# Patient Record
Sex: FEMALE | Race: WHITE | HISPANIC OR LATINO | Employment: UNEMPLOYED | URBAN - METROPOLITAN AREA
[De-identification: names, ages, dates, MRNs, and addresses within clinical notes are randomized per-mention and may not be internally consistent; named-entity substitution may affect disease eponyms.]

---

## 2020-09-15 DIAGNOSIS — Z00.00 ROUTINE GENERAL MEDICAL EXAMINATION AT A HEALTH CARE FACILITY: Primary | ICD-10-CM

## 2020-10-21 NOTE — PROGRESS NOTES
"Subjective:      Patient ID: Yessi Guajardo is a 30 y.o. female.    Chief Complaint: Executive Health      HPI  Pt here for first year of Executive physical with [employer].  Just delivered healthy baby, breastfeeding.  No f/c/sw/cough. No cp/sob/palp.  BMs ok with fiber.  Urine normal.      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    Review of Systems   Constitutional: Negative for appetite change, chills, diaphoresis and fever.   HENT: Negative for congestion, ear pain, rhinorrhea, sinus pressure and sore throat.    Respiratory: Negative for cough, chest tightness and shortness of breath.    Cardiovascular: Negative for chest pain and palpitations.   Gastrointestinal: Negative for blood in stool, constipation, diarrhea, nausea and vomiting.   Genitourinary: Negative for dysuria, frequency, hematuria, menstrual problem, urgency and vaginal discharge.   Musculoskeletal: Negative for arthralgias.   Skin: Negative for rash.   Neurological: Negative for dizziness and headaches.   Psychiatric/Behavioral: Negative for sleep disturbance. The patient is not nervous/anxious.          Objective:   /68 (BP Location: Right arm, Patient Position: Sitting, BP Method: Medium (Manual))   Pulse 86   Temp 98.8 °F (37.1 °C) (Oral)   Resp 16   Ht 5' 5" (1.651 m)   Wt 77 kg (169 lb 12.1 oz)   LMP 01/01/2020 (Within Days)   SpO2 98%   BMI 28.25 kg/m²     Physical Exam  Constitutional:       Appearance: She is well-developed.   HENT:      Right Ear: External ear normal. Tympanic membrane is not injected.      Left Ear: External ear normal. Tympanic membrane " is not injected.   Eyes:      Conjunctiva/sclera: Conjunctivae normal.   Neck:      Musculoskeletal: Normal range of motion and neck supple.      Thyroid: No thyromegaly.   Cardiovascular:      Rate and Rhythm: Normal rate and regular rhythm.      Heart sounds: No murmur. No friction rub. No gallop.    Pulmonary:      Effort: Pulmonary effort is normal.      Breath sounds: Normal breath sounds. No wheezing or rales.   Abdominal:      General: Bowel sounds are normal.      Palpations: Abdomen is soft. There is no mass.      Tenderness: There is no abdominal tenderness.   Lymphadenopathy:      Cervical: No cervical adenopathy.   Skin:     General: Skin is warm.      Findings: No rash.   Neurological:      Mental Status: She is alert and oriented to person, place, and time.       CXR  EKG normal.      Assessment:       1. Encounter for preventive health examination          Plan:     Encounter for preventive health examination- Report results when available.

## 2020-10-29 DIAGNOSIS — Z00.00 ROUTINE GENERAL MEDICAL EXAMINATION AT A HEALTH CARE FACILITY: Primary | ICD-10-CM

## 2020-10-30 ENCOUNTER — HOSPITAL ENCOUNTER (OUTPATIENT)
Dept: RADIOLOGY | Facility: HOSPITAL | Age: 30
Discharge: HOME | End: 2020-10-30
Attending: FAMILY MEDICINE
Payer: COMMERCIAL

## 2020-10-30 ENCOUNTER — HOSPITAL ENCOUNTER (OUTPATIENT)
Dept: CARDIOLOGY | Facility: HOSPITAL | Age: 30
Discharge: HOME | End: 2020-10-30
Attending: FAMILY MEDICINE
Payer: COMMERCIAL

## 2020-10-30 ENCOUNTER — CLINICAL SUPPORT (OUTPATIENT)
Dept: INTERNAL MEDICINE | Facility: CLINIC | Age: 30
End: 2020-10-30
Payer: COMMERCIAL

## 2020-10-30 ENCOUNTER — OFFICE VISIT (OUTPATIENT)
Dept: INTERNAL MEDICINE | Facility: CLINIC | Age: 30
End: 2020-10-30
Payer: COMMERCIAL

## 2020-10-30 VITALS
WEIGHT: 169.75 LBS | OXYGEN SATURATION: 98 % | HEIGHT: 65 IN | RESPIRATION RATE: 16 BRPM | HEART RATE: 86 BPM | SYSTOLIC BLOOD PRESSURE: 112 MMHG | DIASTOLIC BLOOD PRESSURE: 68 MMHG | TEMPERATURE: 99 F | BODY MASS INDEX: 28.28 KG/M2

## 2020-10-30 DIAGNOSIS — Z00.00 ROUTINE GENERAL MEDICAL EXAMINATION AT A HEALTH CARE FACILITY: ICD-10-CM

## 2020-10-30 DIAGNOSIS — Z00.00 ROUTINE GENERAL MEDICAL EXAMINATION AT A HEALTH CARE FACILITY: Primary | ICD-10-CM

## 2020-10-30 DIAGNOSIS — Z00.00 ENCOUNTER FOR PREVENTIVE HEALTH EXAMINATION: Primary | ICD-10-CM

## 2020-10-30 LAB
ALBUMIN SERPL BCP-MCNC: 3.7 G/DL (ref 3.5–5.2)
ALP SERPL-CCNC: 73 U/L (ref 55–135)
ALT SERPL W/O P-5'-P-CCNC: 22 U/L (ref 10–44)
ANION GAP SERPL CALC-SCNC: 9 MMOL/L (ref 8–16)
AST SERPL-CCNC: 22 U/L (ref 10–40)
BILIRUB SERPL-MCNC: 0.3 MG/DL (ref 0.1–1)
BUN SERPL-MCNC: 11 MG/DL (ref 6–20)
CALCIUM SERPL-MCNC: 9.2 MG/DL (ref 8.7–10.5)
CHLORIDE SERPL-SCNC: 110 MMOL/L (ref 95–110)
CHOLEST SERPL-MCNC: 258 MG/DL (ref 120–199)
CHOLEST/HDLC SERPL: 4.6 {RATIO} (ref 2–5)
CO2 SERPL-SCNC: 22 MMOL/L (ref 23–29)
CREAT SERPL-MCNC: 0.6 MG/DL (ref 0.5–1.4)
ERYTHROCYTE [DISTWIDTH] IN BLOOD BY AUTOMATED COUNT: 12.3 % (ref 11.5–14.5)
EST. GFR  (AFRICAN AMERICAN): >60 ML/MIN/1.73 M^2
EST. GFR  (NON AFRICAN AMERICAN): >60 ML/MIN/1.73 M^2
ESTIMATED AVG GLUCOSE: 88 MG/DL (ref 68–131)
GLUCOSE SERPL-MCNC: 92 MG/DL (ref 70–110)
HBA1C MFR BLD HPLC: 4.7 % (ref 4–5.6)
HCT VFR BLD AUTO: 37.4 % (ref 37–48.5)
HDLC SERPL-MCNC: 56 MG/DL (ref 40–75)
HDLC SERPL: 21.7 % (ref 20–50)
HGB BLD-MCNC: 11.9 G/DL (ref 12–16)
LDLC SERPL CALC-MCNC: 177.6 MG/DL (ref 63–159)
MCH RBC QN AUTO: 30.7 PG (ref 27–31)
MCHC RBC AUTO-ENTMCNC: 31.8 G/DL (ref 32–36)
MCV RBC AUTO: 96 FL (ref 82–98)
NONHDLC SERPL-MCNC: 202 MG/DL
PLATELET # BLD AUTO: 348 K/UL (ref 150–350)
PMV BLD AUTO: 10.5 FL (ref 9.2–12.9)
POTASSIUM SERPL-SCNC: 4.6 MMOL/L (ref 3.5–5.1)
PROT SERPL-MCNC: 7.3 G/DL (ref 6–8.4)
RBC # BLD AUTO: 3.88 M/UL (ref 4–5.4)
SODIUM SERPL-SCNC: 141 MMOL/L (ref 136–145)
TRIGL SERPL-MCNC: 122 MG/DL (ref 30–150)
TSH SERPL DL<=0.005 MIU/L-ACNC: 1.74 UIU/ML (ref 0.4–4)
WBC # BLD AUTO: 6.02 K/UL (ref 3.9–12.7)

## 2020-10-30 PROCEDURE — 99385 PR PREVENTIVE VISIT,NEW,18-39: ICD-10-PCS | Mod: S$GLB,,, | Performed by: INTERNAL MEDICINE

## 2020-10-30 PROCEDURE — 71046 X-RAY EXAM CHEST 2 VIEWS: CPT | Mod: 26,,, | Performed by: RADIOLOGY

## 2020-10-30 PROCEDURE — 83036 HEMOGLOBIN GLYCOSYLATED A1C: CPT

## 2020-10-30 PROCEDURE — 86803 HEPATITIS C AB TEST: CPT

## 2020-10-30 PROCEDURE — 71046 X-RAY EXAM CHEST 2 VIEWS: CPT | Mod: TC

## 2020-10-30 PROCEDURE — 99999 PR PBB SHADOW E&M-EST. PATIENT-LVL III: CPT | Mod: PBBFAC,,, | Performed by: INTERNAL MEDICINE

## 2020-10-30 PROCEDURE — 99999 PR PBB SHADOW E&M-EST. PATIENT-LVL III: ICD-10-PCS | Mod: PBBFAC,,, | Performed by: INTERNAL MEDICINE

## 2020-10-30 PROCEDURE — 80061 LIPID PANEL: CPT

## 2020-10-30 PROCEDURE — 99385 PREV VISIT NEW AGE 18-39: CPT | Mod: S$GLB,,, | Performed by: INTERNAL MEDICINE

## 2020-10-30 PROCEDURE — 93010 EKG 12-LEAD: ICD-10-PCS | Mod: ,,, | Performed by: INTERNAL MEDICINE

## 2020-10-30 PROCEDURE — 93005 ELECTROCARDIOGRAM TRACING: CPT

## 2020-10-30 PROCEDURE — 86703 HIV-1/HIV-2 1 RESULT ANTBDY: CPT

## 2020-10-30 PROCEDURE — 80053 COMPREHEN METABOLIC PANEL: CPT

## 2020-10-30 PROCEDURE — 71046 XR CHEST PA AND LATERAL: ICD-10-PCS | Mod: 26,,, | Performed by: RADIOLOGY

## 2020-10-30 PROCEDURE — 93010 ELECTROCARDIOGRAM REPORT: CPT | Mod: ,,, | Performed by: INTERNAL MEDICINE

## 2020-10-30 PROCEDURE — 85027 COMPLETE CBC AUTOMATED: CPT

## 2020-10-30 PROCEDURE — 84443 ASSAY THYROID STIM HORMONE: CPT

## 2020-10-30 NOTE — LETTER
October 30, 2020    Yessi Guajardo  [address]  [place] [address]             [facility]  [address]  [place] [address]  Phone: [phone]  Fax: [phone] Dear Ms. Carla Guajardo,    It was a pleasure to see you and perform your Executive Health physical on Friday, October 30, 2020.  At the time of your visit you are 30 years old.  You have had no fevers, chills, sweats, or cough.  You deny any exertional chest pain, shortness of breath, or palpitations.  Your bowel and bladder function are pretty normal.    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  You take only prenatal vitamins.    Your health maintenance includes 10/2020 flu vaccination, 07/2020 tetanus diphtheria pertussis vaccination, gynecologic exam is up-to-date.     On physical exam height is you 5 ft 5 in , your weight is 169 lb,  body mass index is 28.25.Your blood pressure 112/68,  pulse 86,  Your general appearance is well-developed,  with no distress. Your head and neck exam is normal.  Your heart has a regular rate and rhythm with no abnormal sounds.  Your lungs are clear throughout, with normal respiratory effort.  Your abdomen has normal bowel sounds with no masses or enlarged organs noted. Your extremities have strong distal pulses, with no swelling.     Your chest x-ray and EKG are normal.  Your blood count is normal, platelets 348 normal.  Your kidney and liver function is normal.  Your fasting glucose 92, normal.  Your electrolytes are normal.  Your total cholesterol is 258, HDL 56, , triglycerides 122.  This is pretty good overall considering you have just had a pregnancy.  Hemoglobin A1c 4.7%, normal.  TSH 1.740, normal.  HIV test is negative.    In summary, you appear to be in good overall health.  Please do increase your activity as tolerated after you recover from giving birth.  You are up to date with all vaccinations and preventive care  needed at this time.    It was a pleasure to see you and perform your executive physical.  If I can be of further assistance, or if you have any other questions or concerns please do not hesitate to let me know.    Yours very truly,      Ally Cleveland MD

## 2020-11-02 LAB
HCV AB SERPL QL IA: NEGATIVE
HIV 1+2 AB+HIV1 P24 AG SERPL QL IA: NEGATIVE

## 2021-09-17 ENCOUNTER — OFFICE VISIT (OUTPATIENT)
Dept: INTERNAL MEDICINE | Facility: CLINIC | Age: 31
End: 2021-09-17
Payer: COMMERCIAL

## 2021-09-17 ENCOUNTER — CLINICAL SUPPORT (OUTPATIENT)
Dept: INTERNAL MEDICINE | Facility: CLINIC | Age: 31
End: 2021-09-17
Payer: COMMERCIAL

## 2021-09-17 VITALS
TEMPERATURE: 96 F | HEART RATE: 77 BPM | DIASTOLIC BLOOD PRESSURE: 64 MMHG | WEIGHT: 171.94 LBS | SYSTOLIC BLOOD PRESSURE: 101 MMHG | BODY MASS INDEX: 28.65 KG/M2 | HEIGHT: 65 IN

## 2021-09-17 DIAGNOSIS — Z00.00 ROUTINE GENERAL MEDICAL EXAMINATION AT A HEALTH CARE FACILITY: Primary | ICD-10-CM

## 2021-09-17 LAB
ALBUMIN SERPL BCP-MCNC: 3.1 G/DL (ref 3.5–5.2)
ALP SERPL-CCNC: 44 U/L (ref 55–135)
ALT SERPL W/O P-5'-P-CCNC: 9 U/L (ref 10–44)
ANION GAP SERPL CALC-SCNC: 8 MMOL/L (ref 8–16)
AST SERPL-CCNC: 10 U/L (ref 10–40)
BILIRUB SERPL-MCNC: 0.4 MG/DL (ref 0.1–1)
BUN SERPL-MCNC: 4 MG/DL (ref 6–20)
CALCIUM SERPL-MCNC: 8.6 MG/DL (ref 8.7–10.5)
CHLORIDE SERPL-SCNC: 108 MMOL/L (ref 95–110)
CHOLEST SERPL-MCNC: 200 MG/DL (ref 120–199)
CHOLEST/HDLC SERPL: 3.6 {RATIO} (ref 2–5)
CO2 SERPL-SCNC: 21 MMOL/L (ref 23–29)
CREAT SERPL-MCNC: 0.5 MG/DL (ref 0.5–1.4)
ERYTHROCYTE [DISTWIDTH] IN BLOOD BY AUTOMATED COUNT: 12.7 % (ref 11.5–14.5)
EST. GFR  (AFRICAN AMERICAN): >60 ML/MIN/1.73 M^2
EST. GFR  (NON AFRICAN AMERICAN): >60 ML/MIN/1.73 M^2
ESTIMATED AVG GLUCOSE: 88 MG/DL (ref 68–131)
GLUCOSE SERPL-MCNC: 87 MG/DL (ref 70–110)
HBA1C MFR BLD: 4.7 % (ref 4–5.6)
HCT VFR BLD AUTO: 34.9 % (ref 37–48.5)
HDLC SERPL-MCNC: 56 MG/DL (ref 40–75)
HDLC SERPL: 28 % (ref 20–50)
HGB BLD-MCNC: 12 G/DL (ref 12–16)
LDLC SERPL CALC-MCNC: 107.4 MG/DL (ref 63–159)
MCH RBC QN AUTO: 32.5 PG (ref 27–31)
MCHC RBC AUTO-ENTMCNC: 34.4 G/DL (ref 32–36)
MCV RBC AUTO: 95 FL (ref 82–98)
NONHDLC SERPL-MCNC: 144 MG/DL
PLATELET # BLD AUTO: 256 K/UL (ref 150–450)
PMV BLD AUTO: 10.7 FL (ref 9.2–12.9)
POTASSIUM SERPL-SCNC: 3.7 MMOL/L (ref 3.5–5.1)
PROT SERPL-MCNC: 6.4 G/DL (ref 6–8.4)
RBC # BLD AUTO: 3.69 M/UL (ref 4–5.4)
SODIUM SERPL-SCNC: 137 MMOL/L (ref 136–145)
TRIGL SERPL-MCNC: 183 MG/DL (ref 30–150)
TSH SERPL DL<=0.005 MIU/L-ACNC: 1.49 UIU/ML (ref 0.4–4)
WBC # BLD AUTO: 7.32 K/UL (ref 3.9–12.7)

## 2021-09-17 PROCEDURE — 3074F PR MOST RECENT SYSTOLIC BLOOD PRESSURE < 130 MM HG: ICD-10-PCS | Mod: CPTII,S$GLB,, | Performed by: INTERNAL MEDICINE

## 2021-09-17 PROCEDURE — 3044F PR MOST RECENT HEMOGLOBIN A1C LEVEL <7.0%: ICD-10-PCS | Mod: CPTII,S$GLB,, | Performed by: INTERNAL MEDICINE

## 2021-09-17 PROCEDURE — 99999 PR PBB SHADOW E&M-EST. PATIENT-LVL III: CPT | Mod: PBBFAC,,, | Performed by: INTERNAL MEDICINE

## 2021-09-17 PROCEDURE — 3044F HG A1C LEVEL LT 7.0%: CPT | Mod: CPTII,S$GLB,, | Performed by: INTERNAL MEDICINE

## 2021-09-17 PROCEDURE — 83036 HEMOGLOBIN GLYCOSYLATED A1C: CPT | Performed by: INTERNAL MEDICINE

## 2021-09-17 PROCEDURE — 3008F PR BODY MASS INDEX (BMI) DOCUMENTED: ICD-10-PCS | Mod: CPTII,S$GLB,, | Performed by: INTERNAL MEDICINE

## 2021-09-17 PROCEDURE — 3078F DIAST BP <80 MM HG: CPT | Mod: CPTII,S$GLB,, | Performed by: INTERNAL MEDICINE

## 2021-09-17 PROCEDURE — 80061 LIPID PANEL: CPT | Performed by: INTERNAL MEDICINE

## 2021-09-17 PROCEDURE — 1159F MED LIST DOCD IN RCRD: CPT | Mod: CPTII,S$GLB,, | Performed by: INTERNAL MEDICINE

## 2021-09-17 PROCEDURE — 3008F BODY MASS INDEX DOCD: CPT | Mod: CPTII,S$GLB,, | Performed by: INTERNAL MEDICINE

## 2021-09-17 PROCEDURE — 85027 COMPLETE CBC AUTOMATED: CPT | Performed by: INTERNAL MEDICINE

## 2021-09-17 PROCEDURE — 3078F PR MOST RECENT DIASTOLIC BLOOD PRESSURE < 80 MM HG: ICD-10-PCS | Mod: CPTII,S$GLB,, | Performed by: INTERNAL MEDICINE

## 2021-09-17 PROCEDURE — 80053 COMPREHEN METABOLIC PANEL: CPT | Performed by: INTERNAL MEDICINE

## 2021-09-17 PROCEDURE — 99395 PR PREVENTIVE VISIT,EST,18-39: ICD-10-PCS | Mod: S$GLB,,, | Performed by: INTERNAL MEDICINE

## 2021-09-17 PROCEDURE — 1159F PR MEDICATION LIST DOCUMENTED IN MEDICAL RECORD: ICD-10-PCS | Mod: CPTII,S$GLB,, | Performed by: INTERNAL MEDICINE

## 2021-09-17 PROCEDURE — 99395 PREV VISIT EST AGE 18-39: CPT | Mod: S$GLB,,, | Performed by: INTERNAL MEDICINE

## 2021-09-17 PROCEDURE — 3074F SYST BP LT 130 MM HG: CPT | Mod: CPTII,S$GLB,, | Performed by: INTERNAL MEDICINE

## 2021-09-17 PROCEDURE — 86803 HEPATITIS C AB TEST: CPT | Performed by: INTERNAL MEDICINE

## 2021-09-17 PROCEDURE — 84443 ASSAY THYROID STIM HORMONE: CPT | Performed by: INTERNAL MEDICINE

## 2021-09-17 PROCEDURE — 99999 PR PBB SHADOW E&M-EST. PATIENT-LVL III: ICD-10-PCS | Mod: PBBFAC,,, | Performed by: INTERNAL MEDICINE

## 2021-09-20 LAB — HCV AB SERPL QL IA: NEGATIVE

## 2022-08-29 DIAGNOSIS — Z00.00 ROUTINE GENERAL MEDICAL EXAMINATION AT A HEALTH CARE FACILITY: Primary | ICD-10-CM

## 2022-09-01 ENCOUNTER — HOSPITAL ENCOUNTER (OUTPATIENT)
Dept: CARDIOLOGY | Facility: HOSPITAL | Age: 32
Discharge: HOME | End: 2022-09-01
Payer: COMMERCIAL

## 2022-09-01 ENCOUNTER — OFFICE VISIT (OUTPATIENT)
Dept: INTERNAL MEDICINE | Facility: CLINIC | Age: 32
End: 2022-09-01
Payer: COMMERCIAL

## 2022-09-01 ENCOUNTER — OFFICE VISIT (OUTPATIENT)
Dept: PRIMARY CARE CLINIC | Facility: CLINIC | Age: 32
End: 2022-09-01
Payer: COMMERCIAL

## 2022-09-01 ENCOUNTER — CLINICAL SUPPORT (OUTPATIENT)
Dept: INTERNAL MEDICINE | Facility: CLINIC | Age: 32
End: 2022-09-01
Payer: COMMERCIAL

## 2022-09-01 VITALS
DIASTOLIC BLOOD PRESSURE: 70 MMHG | HEIGHT: 64 IN | TEMPERATURE: 98 F | WEIGHT: 187.38 LBS | BODY MASS INDEX: 31.99 KG/M2 | RESPIRATION RATE: 16 BRPM | SYSTOLIC BLOOD PRESSURE: 108 MMHG | HEART RATE: 71 BPM

## 2022-09-01 VITALS
SYSTOLIC BLOOD PRESSURE: 110 MMHG | DIASTOLIC BLOOD PRESSURE: 78 MMHG | RESPIRATION RATE: 18 BRPM | WEIGHT: 189.13 LBS | OXYGEN SATURATION: 100 % | BODY MASS INDEX: 32.47 KG/M2 | HEART RATE: 78 BPM

## 2022-09-01 DIAGNOSIS — E88.819 INSULIN RESISTANCE: Primary | ICD-10-CM

## 2022-09-01 DIAGNOSIS — Z00.00 ROUTINE GENERAL MEDICAL EXAMINATION AT A HEALTH CARE FACILITY: Primary | ICD-10-CM

## 2022-09-01 DIAGNOSIS — E88.810 METABOLIC SYNDROME: ICD-10-CM

## 2022-09-01 DIAGNOSIS — Z00.00 ROUTINE GENERAL MEDICAL EXAMINATION AT A HEALTH CARE FACILITY: ICD-10-CM

## 2022-09-01 DIAGNOSIS — E88.819 INSULIN RESISTANCE: ICD-10-CM

## 2022-09-01 DIAGNOSIS — E66.09 CLASS 1 OBESITY DUE TO EXCESS CALORIES WITH SERIOUS COMORBIDITY AND BODY MASS INDEX (BMI) OF 32.0 TO 32.9 IN ADULT: ICD-10-CM

## 2022-09-01 DIAGNOSIS — E66.9 OBESITY (BMI 30.0-34.9): ICD-10-CM

## 2022-09-01 PROBLEM — E66.811 OBESITY (BMI 30.0-34.9): Status: ACTIVE | Noted: 2022-09-01

## 2022-09-01 PROBLEM — E66.811 CLASS 1 OBESITY DUE TO EXCESS CALORIES WITH BODY MASS INDEX (BMI) OF 32.0 TO 32.9 IN ADULT: Status: ACTIVE | Noted: 2022-09-01

## 2022-09-01 PROBLEM — E66.811 CLASS 1 OBESITY DUE TO EXCESS CALORIES WITH BODY MASS INDEX (BMI) OF 32.0 TO 32.9 IN ADULT: Status: RESOLVED | Noted: 2022-09-01 | Resolved: 2022-09-01

## 2022-09-01 LAB
ALBUMIN SERPL BCP-MCNC: 4.1 G/DL (ref 3.5–5.2)
ALP SERPL-CCNC: 92 U/L (ref 55–135)
ALT SERPL W/O P-5'-P-CCNC: 22 U/L (ref 10–44)
ANION GAP SERPL CALC-SCNC: 11 MMOL/L (ref 8–16)
AST SERPL-CCNC: 22 U/L (ref 10–40)
BILIRUB SERPL-MCNC: 0.6 MG/DL (ref 0.1–1)
BUN SERPL-MCNC: 15 MG/DL (ref 6–20)
CALCIUM SERPL-MCNC: 9.4 MG/DL (ref 8.7–10.5)
CHLORIDE SERPL-SCNC: 109 MMOL/L (ref 95–110)
CHOLEST SERPL-MCNC: 229 MG/DL (ref 120–199)
CHOLEST/HDLC SERPL: 3.8 {RATIO} (ref 2–5)
CO2 SERPL-SCNC: 20 MMOL/L (ref 23–29)
CREAT SERPL-MCNC: 0.5 MG/DL (ref 0.5–1.4)
ERYTHROCYTE [DISTWIDTH] IN BLOOD BY AUTOMATED COUNT: 12.4 % (ref 11.5–14.5)
EST. GFR  (NO RACE VARIABLE): >60 ML/MIN/1.73 M^2
ESTIMATED AVG GLUCOSE: 94 MG/DL (ref 68–131)
GLUCOSE SERPL-MCNC: 78 MG/DL (ref 70–110)
HBA1C MFR BLD: 4.9 % (ref 4–5.6)
HCT VFR BLD AUTO: 41.3 % (ref 37–48.5)
HDLC SERPL-MCNC: 61 MG/DL (ref 40–75)
HDLC SERPL: 26.6 % (ref 20–50)
HGB BLD-MCNC: 14.3 G/DL (ref 12–16)
LDLC SERPL CALC-MCNC: 155.6 MG/DL (ref 63–159)
MCH RBC QN AUTO: 32.1 PG (ref 27–31)
MCHC RBC AUTO-ENTMCNC: 34.6 G/DL (ref 32–36)
MCV RBC AUTO: 93 FL (ref 82–98)
NONHDLC SERPL-MCNC: 168 MG/DL
PLATELET # BLD AUTO: 285 K/UL (ref 150–450)
PMV BLD AUTO: 11.2 FL (ref 9.2–12.9)
POTASSIUM SERPL-SCNC: 4.2 MMOL/L (ref 3.5–5.1)
PROT SERPL-MCNC: 7.9 G/DL (ref 6–8.4)
RBC # BLD AUTO: 4.46 M/UL (ref 4–5.4)
SODIUM SERPL-SCNC: 140 MMOL/L (ref 136–145)
TRIGL SERPL-MCNC: 62 MG/DL (ref 30–150)
WBC # BLD AUTO: 5.49 K/UL (ref 3.9–12.7)

## 2022-09-01 PROCEDURE — 85027 COMPLETE CBC AUTOMATED: CPT | Performed by: FAMILY MEDICINE

## 2022-09-01 PROCEDURE — 93005 ELECTROCARDIOGRAM TRACING: CPT

## 2022-09-01 PROCEDURE — 99395 PR PREVENTIVE VISIT,EST,18-39: ICD-10-PCS | Mod: S$GLB,,, | Performed by: FAMILY MEDICINE

## 2022-09-01 PROCEDURE — 1160F RVW MEDS BY RX/DR IN RCRD: CPT | Mod: CPTII,S$GLB,, | Performed by: FAMILY MEDICINE

## 2022-09-01 PROCEDURE — 99999 PR PBB SHADOW E&M-EST. PATIENT-LVL III: CPT | Mod: PBBFAC,,, | Performed by: FAMILY MEDICINE

## 2022-09-01 PROCEDURE — 93010 ELECTROCARDIOGRAM REPORT: CPT | Mod: ,,, | Performed by: STUDENT IN AN ORGANIZED HEALTH CARE EDUCATION/TRAINING PROGRAM

## 2022-09-01 PROCEDURE — 3078F PR MOST RECENT DIASTOLIC BLOOD PRESSURE < 80 MM HG: ICD-10-PCS | Mod: CPTII,S$GLB,, | Performed by: FAMILY MEDICINE

## 2022-09-01 PROCEDURE — 99999 PR PBB SHADOW E&M-EST. PATIENT-LVL V: ICD-10-PCS | Mod: PBBFAC,,, | Performed by: FAMILY MEDICINE

## 2022-09-01 PROCEDURE — 3078F DIAST BP <80 MM HG: CPT | Mod: CPTII,S$GLB,, | Performed by: FAMILY MEDICINE

## 2022-09-01 PROCEDURE — 3074F PR MOST RECENT SYSTOLIC BLOOD PRESSURE < 130 MM HG: ICD-10-PCS | Mod: CPTII,S$GLB,, | Performed by: FAMILY MEDICINE

## 2022-09-01 PROCEDURE — 99999 PR PBB SHADOW E&M-EST. PATIENT-LVL III: ICD-10-PCS | Mod: PBBFAC,,, | Performed by: FAMILY MEDICINE

## 2022-09-01 PROCEDURE — 93010 EKG 12-LEAD: ICD-10-PCS | Mod: ,,, | Performed by: STUDENT IN AN ORGANIZED HEALTH CARE EDUCATION/TRAINING PROGRAM

## 2022-09-01 PROCEDURE — 80053 COMPREHEN METABOLIC PANEL: CPT | Performed by: FAMILY MEDICINE

## 2022-09-01 PROCEDURE — 80061 LIPID PANEL: CPT | Performed by: FAMILY MEDICINE

## 2022-09-01 PROCEDURE — 3008F BODY MASS INDEX DOCD: CPT | Mod: CPTII,S$GLB,, | Performed by: FAMILY MEDICINE

## 2022-09-01 PROCEDURE — 3008F PR BODY MASS INDEX (BMI) DOCUMENTED: ICD-10-PCS | Mod: CPTII,S$GLB,, | Performed by: FAMILY MEDICINE

## 2022-09-01 PROCEDURE — 99214 OFFICE O/P EST MOD 30 MIN: CPT | Mod: S$GLB,,, | Performed by: FAMILY MEDICINE

## 2022-09-01 PROCEDURE — 99214 PR OFFICE/OUTPT VISIT, EST, LEVL IV, 30-39 MIN: ICD-10-PCS | Mod: S$GLB,,, | Performed by: FAMILY MEDICINE

## 2022-09-01 PROCEDURE — 83036 HEMOGLOBIN GLYCOSYLATED A1C: CPT | Performed by: FAMILY MEDICINE

## 2022-09-01 PROCEDURE — 3074F SYST BP LT 130 MM HG: CPT | Mod: CPTII,S$GLB,, | Performed by: FAMILY MEDICINE

## 2022-09-01 PROCEDURE — 84443 ASSAY THYROID STIM HORMONE: CPT | Performed by: FAMILY MEDICINE

## 2022-09-01 PROCEDURE — 99395 PREV VISIT EST AGE 18-39: CPT | Mod: S$GLB,,, | Performed by: FAMILY MEDICINE

## 2022-09-01 PROCEDURE — 1159F MED LIST DOCD IN RCRD: CPT | Mod: CPTII,S$GLB,, | Performed by: FAMILY MEDICINE

## 2022-09-01 PROCEDURE — 1159F PR MEDICATION LIST DOCUMENTED IN MEDICAL RECORD: ICD-10-PCS | Mod: CPTII,S$GLB,, | Performed by: FAMILY MEDICINE

## 2022-09-01 PROCEDURE — 99999 PR PBB SHADOW E&M-EST. PATIENT-LVL V: CPT | Mod: PBBFAC,,, | Performed by: FAMILY MEDICINE

## 2022-09-01 PROCEDURE — 1160F PR REVIEW ALL MEDS BY PRESCRIBER/CLIN PHARMACIST DOCUMENTED: ICD-10-PCS | Mod: CPTII,S$GLB,, | Performed by: FAMILY MEDICINE

## 2022-09-01 RX ORDER — ALBUTEROL SULFATE 90 UG/1
AEROSOL, METERED RESPIRATORY (INHALATION)
COMMUNITY
Start: 2022-07-15 | End: 2023-08-25

## 2022-09-01 NOTE — PROGRESS NOTES
"Subjective:       Patient ID: Yessi Guajardo is a 32 y.o. female.    Chief Complaint: Executive Health    32-year-old female patient with Patient Active Problem List:     Insulin resistance     Obesity (BMI 30.0-34.9)  Here for executive health physical.  Patient recently delivered baby 6 months ago and has been trying to lose weight but reports that she has been having trouble losing weight.   Denies any chest pain or difficulty breathing with palpitations but reports that she was fasting since 6:00 p.m. last night, and while getting labs drawn patient was anxious and passed out but has been feeling better now after eating granola bar    Review of Systems   Constitutional:  Negative for fatigue.   Eyes:  Negative for visual disturbance.   Respiratory:  Negative for shortness of breath.    Cardiovascular:  Negative for chest pain and leg swelling.   Gastrointestinal:  Negative for abdominal pain, nausea and vomiting.   Musculoskeletal:  Negative for myalgias.   Skin:  Negative for rash.   Neurological:  Negative for light-headedness and headaches.   Psychiatric/Behavioral:  Negative for sleep disturbance.        /70 (BP Location: Left arm, Patient Position: Sitting, BP Method: Medium (Automatic))   Pulse 71   Temp 97.5 °F (36.4 °C) (Tympanic)   Resp 16   Ht 5' 4" (1.626 m)   Wt 85 kg (187 lb 6.3 oz)   BMI 32.17 kg/m²   Objective:      Physical Exam  Constitutional:       Appearance: She is well-developed.   HENT:      Head: Normocephalic and atraumatic.   Cardiovascular:      Rate and Rhythm: Normal rate and regular rhythm.      Heart sounds: Normal heart sounds. No murmur heard.  Pulmonary:      Effort: Pulmonary effort is normal.      Breath sounds: Normal breath sounds. No wheezing.   Abdominal:      General: Bowel sounds are normal.      Palpations: Abdomen is soft.      Tenderness: There is no abdominal tenderness.   Skin:     General: Skin is warm and dry.      Findings: No rash. "   Neurological:      Mental Status: She is alert and oriented to person, place, and time.   Psychiatric:         Mood and Affect: Mood normal.         Assessment/Plan:   1. Routine general medical examination at a health care facility  Vital signs stable today.  Clinical exam stable  Continue lifestyle modifications with low-fat and low-cholesterol diet and exercise 30 minutes daily      2. Insulin resistance  Diet controlled    3. Obesity (BMI 30.0-34.9)   Lifestyle modifications recommended to lose weight with BMI 32    Reviewed EKG showing normal sinus rhythm  Likely passed out from fasting since 6:00 p.m. last night and was scared of needles.  If it happens more frequently patient may benefit from seeing cardiology, verbalized understanding

## 2022-09-01 NOTE — PROGRESS NOTES
"Subjective:       Patient ID: Yessi Guajardo is a 32 y.o. female.    Pmhx, fam hx, soc hx, surg hx, allergies, med list reviewed     Chief Complaint: Insulin resistance  Referring MD: Meka  PCP: Gerard  BMI noted  Diet: variable  Exercise/Activity: 10,000 + steps  Sleep: fair  Stressors: 2 small children  Anxiety/Depression Screen/PHQ-2: neg mood ok; some frustration with weight loss efforts  Participates in executive wellness -- sees Dr Thakur  Fall labs (9/21): ^ trig; + recent insulin resistance  BMI last OV 32 (Dr Ackerman)    Baby is 6 mos old and almost 2 (boys)    Pt works has always struggled with weight. She states when was smaller thought was "fat." Has gained/lost weight. After first child was easier to help. Always hungry. Is breastfeeding.   2 children and 2 step daughters.   Noticed some skin tags and acanthosis. No fam hx of DM II.   Has a fit bit-- 12,000-15,000 steps/day. Also started working out.   She has restricted nutella. Cut down bread and coffee (cut back)  Sugars in coffee down to 1. Started with more water. Max weight loss with implementation of this: 4 mos in 2 lb.   Ate donut yesterday; had some guilt but denies any disordered eating.   Sometimes pt forgets to eat (despite feeding kids). She would prefer not to skip meals.   Makes overnight oats with rosas and fruit.     Pt dw Dr Ackerman.    Does not eat red meat. Allergic to fish. Would like alternative sources of protein.  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   HENT:  Negative for mouth dryness and goiter.    Eyes:  Negative for visual disturbance.   Respiratory:  Negative for apnea, cough, chest tightness and shortness of breath.    Cardiovascular:  Negative for chest pain, palpitations and leg swelling.   Gastrointestinal:  " Negative for abdominal pain, constipation and diarrhea.   Endocrine: Negative for cold intolerance, heat intolerance, polydipsia, polyphagia and polyuria.   Genitourinary:  Negative for frequency and menstrual problem.   Musculoskeletal:  Negative for arthralgias and myalgias.   Integumentary:  Negative for color change and rash.   Psychiatric/Behavioral:  Negative for sleep disturbance. The patient is not nervous/anxious.        Objective:      Physical Exam  Vitals and nursing note reviewed.   Constitutional:       General: She is not in acute distress.  HENT:      Head: Normocephalic and atraumatic.      Mouth/Throat:      Pharynx: Oropharynx is clear.   Eyes:      General: No scleral icterus.     Pupils: Pupils are equal, round, and reactive to light.   Neck:      Comments: No TM  Cardiovascular:      Rate and Rhythm: Normal rate and regular rhythm.      Pulses: Normal pulses.      Heart sounds: Normal heart sounds. No murmur heard.    No friction rub. No gallop.   Pulmonary:      Effort: Pulmonary effort is normal. No respiratory distress.      Breath sounds: Normal breath sounds. No wheezing, rhonchi or rales.   Abdominal:      General: Bowel sounds are normal. There is no distension.      Palpations: Abdomen is soft.      Tenderness: There is no abdominal tenderness.   Musculoskeletal:         General: No swelling.      Cervical back: Normal range of motion and neck supple. No tenderness.   Lymphadenopathy:      Cervical: No cervical adenopathy.   Skin:     General: Skin is warm.      Capillary Refill: Capillary refill takes less than 2 seconds.      Findings: No erythema or rash.   Neurological:      Mental Status: She is alert and oriented to person, place, and time.   Psychiatric:         Mood and Affect: Mood normal.         Behavior: Behavior normal.       Assessment:         ICD-10-CM ICD-9-CM   1. Insulin resistance  E88.81 277.7   2. Class 1 obesity due to excess calories with serious comorbidity and  body mass index (BMI) of 32.0 to 32.9 in adult  E66.09 278.00    Z68.32 V85.32   3. Metabolic syndrome  E88.81 277.7        Plan:         Insulin resistance  Comments:  dietary interventions/lifestyle  pt declines metformin  criteria met metabolic syndrome (trig, IR, waist)  Orders:  -     Hemoglobin A1C; Future; Expected date: 09/01/2022  -     Lipid Panel; Future; Expected date: 09/01/2022  -     Ambulatory referral/consult to Family Practice    Class 1 obesity due to excess calories with serious comorbidity and body mass index (BMI) of 32.0 to 32.9 in adultMetabolic syndrome  Lifestyle interventions   DW pt lifestyle interventions; carb swaps; making sure is getting enough protein. Pt also is breast feeding so want to make sure getting appropriate macronturients  And calories  Recent cbc reviewed; no fe def anemia; no hx of B12 def  [facility] eat fit shopping list    Labs today; pt to have some executive wellness labs as well.   35+ min spent with pt

## 2022-09-01 NOTE — PATIENT INSTRUCTIONS
"  Some protein considerations:   Hemp hearts  Keo  Flax seed  Nutritional yeast    PB: natural, sunflower butter (nut free)  Skippy or Jif no sugar added    Tortillas: mission carb control, katelin charu lower carb, xtreme, siete    Bread: lyle, carbanaut, and on list            PRODUCE  [] All fresh fruit   [] All fresh vegetables   [] All fresh herbs  [] All herb purees + pastes  [] Pre-spiralized vegetable noodles   [] Steam-In-The-Bag begetables  [] Riced cauliflower  [] Jicama sticks  [] Love Beets  all varieties  [] Wholly Guacamole  all varieties  [] Hummus  all varieties, chickpea + vegetable  [] Tofu Shirataki noodles    [] Tofu  all varieties  [] Tempeh  all varieties    PROTEIN  CHICKEN   [] Boneless, skinless breasts  [] Boneless, skinless thighs  [] Ground chicken breast, at least 93% lean  [] Chicken breast cutlet  [] Aidell's  Chicken Sausage + Chicken Meatballs    TURKEY   [] Turkey breast tenderloin   [] Ground turkey breast, at least 93% lean  [] [place] Naturals  Turkey Sausage    BEEF  [] Tenderloin  [] Sirloin  [] Top Loin  [] Flank Steak  [] Round Steak  [] Filet  [] Lean ground beef, at least 93% lean + grass-fed preferable    PORK  [] Tenderloin  [] Pork Chop  [] Center Cut  [] Radha Naturals  No-Sugar Velarde    BISON  [] Eastman  90 - 95% lean    SEAFOOD  [] All fresh fish + seafood; locally sourced when possible  [] Smoked salmon    HEAT + EAT ENTREES   [] Anuel's Natural Foods  Chicken, Pork, Beef  [] Carlos  "All Natural" Grilled Chicken Breast + Strips, all varieties    SAUCES SPREADS + DIPS  [] Bitchin Sauce  Original, Chipotle, Cilantro Caret  [] Ora's Kitchen  Tzatziki Yogurt Dip, Babaganoush, Hummus  [] Wholly Guacamole  all varieties  [] Hummus  all varieties  [] Meraux Gringo Salsa  all varieties  [] Mrs. Elizabeth's Salsa  all varieties  [] Stubb's All Natural BBQ Sauce  [] Primal Kitchen  Mal Brady, BBQ Sauce  [] Primal Kitchen Pasta Sauce  Roasted " Garlic, Tomato Basil, no-dairy Vodka Sauce  [] Sal & Mary Ann's  HeartSmart Pasta Sauce    DAIRY/DAIRY SUBSTITUTES/EGGS  EGGS   [] All eggs  cage-free, pasture-raise preferable  [] Crepini  egg wraps  [] Vital Farms  Pasture-Raised Egg Bites  [] JUST Egg [vegan]     CREAMERS   [] Califia  Better Half, original + vanilla unsweetened  [] NutPods  all varieties    MILK   [] Horizon Organic  all varieties except chocolate  [] Organic Valley  all varieties except chocolate  [] Organic Valley  ultra-filtered, reduced fat milk     PLANT_BASED MILK ALTERNATIVES  [] All unsweetened almond milks  original, vanilla + chocolate  [] Ripple  unsweetened   [] Milkadamia  original +_ vanilla, unsweetened   [] Forager  original + vanilla, unsweetened   [] Silk Organic  soy milk, unsweetened  [] Oatly  unsweetened  [] Califia  regular + protein-fortified oat milk, unsweetened     CHEESES  [] Regular or reduced fat cheeses  [] BelGioso  Fresh Mozzarella Snack Packs, Parmesan Power-full Snack   [] Goat cheese  [] Fresh mozzarella  [] String cheese  all varieties  [] Yuko Cottage Cheese  [] Elizabeth's Cultured Cottage Cheese  [] Yajaira Life 'Just Like Mozzarella'  plant-based shreds and other varieties  [] Parmela Creamery  plant-based shredded cheese    YOGURT  [] Fage  2% low-fat, plain  [] Siggi's  plain, vanilla  [] Chobani Greek  nonfat + whole milk yogurt, plain   [] Chobani Less Sugar  all flavored varieties   [] Oikos Greek  nonfat, plain  [] Two Good  all varieties   [] Slovenian Provisions  plain  [] [place] Organic  low-fat + nonfat, plain  [] [facility] Farm  goat milk yogurt, plain  [] Kefit  unsweetened, plain  [] Forager  Greek style unsweetened, plain [dairy-free]  [] [place]  unsweetened Greek style, plain [dairy-free]  [] [facility]  almond milk yogurt, vanilla or plain, unsweetened [dairy-free]    FREEZER SECTION  FROZEN VEGGIES  [] All plain frozen veggies + greens [e.g. broccoli, brussels,  carrots, okra, mushrooms, zucchini, yellow squash, butternut squash, kale, spinach, tatiana greens]  [] Riced veggies [e.g. cauliflower, broccoli, butternut squash]  [] Edamame  all varieties  [] Green Giant  [] Veggie Spirals  [] Marinated Veggies [e.g. eggplant, peppers, zucchini]  [] Simply Steam Avant Sprouts  [] Birds Eye  [] Power Blend Italian Style  lentils, broccoli, kale, zucchini  []  Avant Sprouts & Carrots  [] Oven Roasters Broccoli & Cauliflower  [] [place] Blend  [] Tattooed   [] Green Bean Blend  [] Farmer's Market Ratatouille  [] Butter Balsamic Glazed Vegetables  [] Riced Cauliflower & Quinoa Mediterranean Style  [] Marilia's Good Life  Southern Style Greens [sauteed kale + onion]    FROZEN FRUITS  [] All unsweetened frozen fruits  all varieties  [] Dole Fruits & Veggie Blends  Berries 'n Kale  [] Dole Mix-ins  Triple Berry     FROZEN ENTREES  [] The Good Kitchen meals  all varieties [ e.g. Chili Lime Chicken Over Riced Cauliflower]  [] Premium Paleo  Not Mega Momma's Meatloaf  [] Primal Kitchen  Chicken Pesto + Steak Fajitas w/ Peppers & Onions  [] Eating Well Frozen Entrees  Butter Chicken Masala, Steak Carne Asada, Creamy Pesto Chicken, Chicken + Wild Rice Stroganoff, Yellow Plummer Chicken, Sun-dried Tomato Chicken, Chicken Lo Mein  [] Realgood Entree Bowls  Czech Inspired Beef Bowl over Riced Cauliflower, Chicken Burrito Bowl   [] Great Karma Coconut Plummer  [] Mary's  Tamale Ronny with Black Beans, Vegetable Lasagna  [] Kashi Mayan Delton Bake  [] Healthy Choice  Simply Steamers Chicken Fried Rice  [] Basil Pesto Chicken & Angelo Style Pork Power Bowls  [] Tattooed   Enchilada Bowl  [] Dani Farms  Spicy Black Bean Burgers    FROZEN PIZZAS  [] Cauli'flour Foods  Cauliflower Pizza Crusts  [] Outer Aisle  Cauliflower Crust  [] Mary's  Veggie Crust Cheese Pizza  [] Quest Pizza     VEGETARIAN PRODUCTS  [] Beyond Meat  ground 'meat' + grilled  'chicken' strips  [] Tofurkey  Original Italian Sausage + Original Tempeh  [] Gardein  Beefless Ground + Meatless Meatballs  [] Dani Farms Grillers  Original Burger, Crumbles, Meatballs    ICE CREAMS + FROZEN DESSERTS  [] Halo Top  regular + keto series, pops  [] Rebel  ice cream + dessert sandwiches  [] Enlightened  ice cream + bars  [] Nightfood  ice cream  [] Realgood  ice cream  [] Arctic Zero Fit  frozen pint  [] The Frozen Farmer  sorbets  [] Wholly Rollies  Protein Balls, all varieties  [] Dream Pops  Coconut Latte    FROZEN BREAKFASTS  [] Realgood  Breakfast Sandwiches on Cauliflower Cheesy Bread  [] Rebel  ice cream + dessert sandwiches  [] Enlightened  ice cream + bars  [] Nightfood  ice cream  [] Realgood  ice cream  [] Arctic Zero Fit  frozen pint  [] The Frozen Farmer  sorbets  [] Wholly Rollies  Protein Balls, all varieties  [] Dream Pops  Coconut Latte    BREADS/BUNS/WRAPS  [] Darek Bread: All Types - In Freezer Section   [] Flat Out Light Wraps - All Varieties   [] Flat Out Protein Up Carb Down Flat Bread   [] Kontos Whole Wheat Pocket Annette   [] Taz SCOTT 100% Whole Wheat Tortillas   [] LaTortilla Factory Tortillas - Smart & Delicious; 50 or 80-calorie   [] Nature's Own 100% Whole Wheat Bread   [] Orowheat Healthful - 100% Whole Wheat Slice Bread and Colon Thins   [] Orowheat Healthful - Whole Wheat Nuts & Grain Bread; Flax & Seed Bread   [] Pepperidge Farm Natural Whole Grain 15 Bread   [] Pepperidge Farm Natural Whole Grain English Muffin - 100% Whole Wheat   [] [place]idge Farm Very Thin 100% Whole Wheat   [] Jennifer Engel 45 Calories and Delightful   [] Marco' 100% Whole Wheat Thin-Sliced Bagels and English Muffins   [] Western Bagel: Perfect 10     GLUTEN FREE  [] Robert's Gluten Free Bread   [] [place] Bakehouse 7-Grain Gluten Free Bread     LEGUME PASTA   [] Explore Asian Organic Black Bean Spaghetti   [] Modern Table   [] Tolerant Foods       NUT BUTTERS &  JELLIES    NUT BUTTERS   [] Better'n Chocolate: Coconut Chocolate Peanut Butter Spread   [] Better'n Peanut Butter - All Types   [] Earth Balance Coconut and Peanut Spread   [] Óscar's Nut [place]o   [] MaraNatha: All Natural Roasted Cashew Butter - [place] or Creamy   [] MaraNatha: Roasted Peanut Butter   [] Nuts 'N More Peanut [place] - All Flavors   [] PB2 Powder - Original or Chocolate   [] Skippy Natural - Creamy, Super Chunk   [] Smart Balance Peanut Butter - [place] or Creamy   [] Peanut Butter & Company:   [] Smooth , Crunch Time, The Heat Is On, Old Fashioned Smooth, Mighty Nut- Powdered Peanut Butter, Squeeze Pack   [] Smucker's Natural Peanut Butter - [place] or Creamy   [] Sunbutter Nut Butter   [] Wild Friends Protein Peanut Butter/McCoy o Butter - Vanilla or Chocolate     JELLIES  o Polaner's All Fruit   o Clearly Organic Best Choice: Strawberry Fruit Spread       SNACKS    BARS  [] Kashi Bars - Chewy or Crunchy; Honey McCoy o Flax or Peanut Butter   [] KIND Bars - 5 Grams of Sugar or Less   [] KIND Protein Bars - Strong and KIND   [] Nature Valley Protein Bar - All Varieties   [] Nature Valley Roasted Nut Crunch - McCoy Crunch; Peanut Crunch   [] Nature Valley Simple Nut Bar - Roasted Peanut & Honey   [] [facility] Simple Nut Bar - McCoy, Cashew & Sea Salt   [] [facility] Nut [place] Bar - Salted Caramel Peanut   [] Think Thin Protein Bars   [] Quest Bars, Power Crunch Bars, Pure Protein Bars     BEEF JERKY - NITRATE FREE   [] Game On   [] Grass Run Farms   [] Krave   [] Ostrim   [] Perky Jerky   [] Primal Strips Meatless Vegan Jerky   [] [place]     CHIPS   [] Beanitos Chips   [] Fruit Crisps - e.g. Brother's-All-Natural, Bare Fruit, Yoga Chips   [] Sonia's Soy Crisps: 1.3 ounce bag   [] Quest Protein Chips   [] Wasa Whole Wheat Crisp Bread     CRACKERS  [] Shea's Gone Crackers   [] Nabisco Triscuit: Regular and Thin Crisp Crackers   [] Vans Say Cheese Crackers (G-F)     POPCORN/NUTS    [] Fabio Mccallum's Smart Pop Popcorn - Single Serving   [] 100-Calorie Pack of Nuts - All Varieties     PROTEIN POWDERS & DRINKS  []  Protein -  Whey Protein Powder   [] Garden of Life Raw Protein Powder   [] Iconic Ready-To-Drink Protein Drink   [] Osborne One Protein Powder   [] VegaSport Protein Powder     SALSA/HUMMUS/DIPS   [] Eat Well Embrace Life: Zesty Sriracha Carrot o Hummus   [] Pre-Portioned Guacamole Packs   [] Nguyen's   [] Tostitos Restaurant Style Salsa       SOUPS   [] Mary's Organic Soups - Lentil, Vegetable, Split Pea, Low-Sodium     CANNED GOODS   [] 100% Pure Pumpkin   [] BlueRunner Creole Cream-Style Red Beans or Navy Beans   [] Cajun Power Chicken Gumbo Base   [] Chicken of the Sea [place]   [] Jocy Fresh Cut Sliced Beets   [] Hormel Breast of Chicken in Water   [] LeSuer Tender Baby Whole Carrots   [] Latisha Tabasco [place] Starter   [] StarKist: Chunk Lite Tuna in Water, Gourmet Select Pouches   [] StarKist: Yellowfin Tuna Fillets   [] Trappey's: Kidney, Butter, [place]o, Black Eye, Field, and Black Beans   [] ORIN Bradley's Turnip Greens or See Spinach     CONDIMENTS/ SAUCES/SPREADS/ SPICES  [] Hamlet Booker's Magic Seasonings - Regular or Salt Free   [] Monty Borges's Sauces - All Flavors   [] Laughing Cow Light - All Flavors   [] Dash Salt-Free Marinade - All Flavors   [] Jonathan & Mary Ann's: Heart Smart Pasta Sauces   [] Tabasco     SALAD DRESSINGS  [] Catrina's Naturals: Lite Honey Mustard   [] Juaquin's Own: Lighten Up Salad Dressing - All Varieties   [] OPA Greek Yogurt Dressings - Ranch, Blue o Cheese, Caesar, Feta Dill     SWEETENERS  [] Sweet Nabesna Sweetener   [] Swerve   [] Truvia     BEVERAGES  [] Coconut Water   [] Crystal Light PURE - All Flavors   [] Honest Tea: Just Green Tea, Unsweetened   [] Kombucha Tea   [] [place]   [] [facility] Bloody Shea Mix   [] Metromint - Zero-Sugar; All Natural Flavored   [] Sánchez - Plain or Flavored   [] Spindriff  Tay   [] Steaz - Zero-Sugar, All-Natural, Sparkling Tea   [] Tea Bags: Any Brand - e.g. Meliza, Yogi, Tazzo, Celestial   [] V8 100% Vegetable Juice   [] Vitamin Water Zero   [] Water   [] Zevia - Stevia Sweetened Soft Drink     BEER/ELANA/LIQUORS  []Mcginnis's Premier Light 64 Calories   [] Bud Select - 55 Calories   [] [facility] Bloody Shea Mix   [] Hsieh Genuine Draft - 64 Calories   [] Red or White Wine - All Varieties     CEREALS: HOT/COLD   [] Haley's Puffin's Original Cereal  [] Emmie's Mill Oat Bran Hot Cereal - Cracked Wheat, Multi-Grain  [] Kashi GoLean Cereal  [] Kashi GoLean Hot Cereal packets - Vanilla; Honey Cinnamon  [] Komal's Special K Protein Cereal  [] Ke's Steel Cut Syeda Oatmeal  [] Nature's Path Smart Bran  [] Yazidism Instant Oatmeal packet, Original  [] Yazidism Old Fashioned Yazidism Oats  [] Uncle Nick's Whole Wheat & Flaxseed Original Cereal

## 2022-09-02 LAB — TSH SERPL DL<=0.005 MIU/L-ACNC: 0.78 UIU/ML (ref 0.4–4)

## 2023-04-17 DIAGNOSIS — Z00.00 ROUTINE GENERAL MEDICAL EXAMINATION AT A HEALTH CARE FACILITY: Primary | ICD-10-CM

## 2023-08-25 ENCOUNTER — HOSPITAL ENCOUNTER (OUTPATIENT)
Dept: CARDIOLOGY | Facility: HOSPITAL | Age: 33
Discharge: HOME | End: 2023-08-25
Attending: INTERNAL MEDICINE
Payer: COMMERCIAL

## 2023-08-25 ENCOUNTER — CLINICAL SUPPORT (OUTPATIENT)
Dept: INTERNAL MEDICINE | Facility: CLINIC | Age: 33
End: 2023-08-25
Payer: COMMERCIAL

## 2023-08-25 ENCOUNTER — CLINICAL SUPPORT (OUTPATIENT)
Dept: REHABILITATION | Facility: HOSPITAL | Age: 33
End: 2023-08-25
Attending: INTERNAL MEDICINE

## 2023-08-25 ENCOUNTER — CLINICAL SUPPORT (OUTPATIENT)
Dept: AUDIOLOGY | Facility: CLINIC | Age: 33
End: 2023-08-25
Payer: COMMERCIAL

## 2023-08-25 ENCOUNTER — OFFICE VISIT (OUTPATIENT)
Dept: INTERNAL MEDICINE | Facility: CLINIC | Age: 33
End: 2023-08-25
Payer: COMMERCIAL

## 2023-08-25 VITALS
HEIGHT: 65 IN | SYSTOLIC BLOOD PRESSURE: 124 MMHG | TEMPERATURE: 99 F | WEIGHT: 187 LBS | DIASTOLIC BLOOD PRESSURE: 83 MMHG | BODY MASS INDEX: 31.16 KG/M2 | HEART RATE: 76 BPM

## 2023-08-25 DIAGNOSIS — Z01.12 HEARING CONSERVATION AND TREATMENT EXAM: Primary | ICD-10-CM

## 2023-08-25 DIAGNOSIS — E88.819 INSULIN RESISTANCE: ICD-10-CM

## 2023-08-25 DIAGNOSIS — E66.9 OBESITY (BMI 30-39.9): ICD-10-CM

## 2023-08-25 DIAGNOSIS — Z00.00 ROUTINE GENERAL MEDICAL EXAMINATION AT A HEALTH CARE FACILITY: Primary | ICD-10-CM

## 2023-08-25 DIAGNOSIS — Z00.00 ROUTINE GENERAL MEDICAL EXAMINATION AT A HEALTH CARE FACILITY: ICD-10-CM

## 2023-08-25 LAB
ALBUMIN SERPL BCP-MCNC: 4 G/DL (ref 3.5–5.2)
ALP SERPL-CCNC: 70 U/L (ref 55–135)
ALT SERPL W/O P-5'-P-CCNC: 13 U/L (ref 10–44)
ANION GAP SERPL CALC-SCNC: 9 MMOL/L (ref 8–16)
AST SERPL-CCNC: 14 U/L (ref 10–40)
BILIRUB SERPL-MCNC: 0.3 MG/DL (ref 0.1–1)
BUN SERPL-MCNC: 7 MG/DL (ref 6–20)
CALCIUM SERPL-MCNC: 9.3 MG/DL (ref 8.7–10.5)
CHLORIDE SERPL-SCNC: 108 MMOL/L (ref 95–110)
CHOLEST SERPL-MCNC: 238 MG/DL (ref 120–199)
CHOLEST/HDLC SERPL: 5.2 {RATIO} (ref 2–5)
CO2 SERPL-SCNC: 23 MMOL/L (ref 23–29)
CREAT SERPL-MCNC: 0.7 MG/DL (ref 0.5–1.4)
ERYTHROCYTE [DISTWIDTH] IN BLOOD BY AUTOMATED COUNT: 12.3 % (ref 11.5–14.5)
EST. GFR  (NO RACE VARIABLE): >60 ML/MIN/1.73 M^2
ESTIMATED AVG GLUCOSE: 97 MG/DL (ref 68–131)
GLUCOSE SERPL-MCNC: 96 MG/DL (ref 70–110)
HBA1C MFR BLD: 5 % (ref 4–5.6)
HCT VFR BLD AUTO: 40.1 % (ref 37–48.5)
HDLC SERPL-MCNC: 46 MG/DL (ref 40–75)
HDLC SERPL: 19.3 % (ref 20–50)
HGB BLD-MCNC: 13.9 G/DL (ref 12–16)
LDLC SERPL CALC-MCNC: 157 MG/DL (ref 63–159)
MCH RBC QN AUTO: 32 PG (ref 27–31)
MCHC RBC AUTO-ENTMCNC: 34.7 G/DL (ref 32–36)
MCV RBC AUTO: 92 FL (ref 82–98)
NONHDLC SERPL-MCNC: 192 MG/DL
PLATELET # BLD AUTO: 300 K/UL (ref 150–450)
PMV BLD AUTO: 11.1 FL (ref 9.2–12.9)
POTASSIUM SERPL-SCNC: 4 MMOL/L (ref 3.5–5.1)
PROT SERPL-MCNC: 7.4 G/DL (ref 6–8.4)
RBC # BLD AUTO: 4.35 M/UL (ref 4–5.4)
SODIUM SERPL-SCNC: 140 MMOL/L (ref 136–145)
TRIGL SERPL-MCNC: 175 MG/DL (ref 30–150)
TSH SERPL DL<=0.005 MIU/L-ACNC: 1.25 UIU/ML (ref 0.4–4)
WBC # BLD AUTO: 6.19 K/UL (ref 3.9–12.7)

## 2023-08-25 PROCEDURE — 84443 ASSAY THYROID STIM HORMONE: CPT | Performed by: INTERNAL MEDICINE

## 2023-08-25 PROCEDURE — 99199 UNLISTED SPECIAL SVC PX/RPRT: CPT | Mod: ,,, | Performed by: DIETITIAN, REGISTERED

## 2023-08-25 PROCEDURE — 99395 PREV VISIT EST AGE 18-39: CPT | Mod: S$PBB,,, | Performed by: INTERNAL MEDICINE

## 2023-08-25 PROCEDURE — 93010 EKG 12-LEAD: ICD-10-PCS | Mod: ,,, | Performed by: INTERNAL MEDICINE

## 2023-08-25 PROCEDURE — 93005 ELECTROCARDIOGRAM TRACING: CPT

## 2023-08-25 PROCEDURE — 83525 ASSAY OF INSULIN: CPT | Performed by: INTERNAL MEDICINE

## 2023-08-25 PROCEDURE — 99999 PR PBB SHADOW E&M-EST. PATIENT-LVL III: ICD-10-PCS | Mod: PBBFAC,,, | Performed by: INTERNAL MEDICINE

## 2023-08-25 PROCEDURE — 85027 COMPLETE CBC AUTOMATED: CPT | Performed by: INTERNAL MEDICINE

## 2023-08-25 PROCEDURE — 99999 PR PBB SHADOW E&M-EST. PATIENT-LVL I: CPT | Mod: PBBFAC,,,

## 2023-08-25 PROCEDURE — 99999 PR PBB SHADOW E&M-EST. PATIENT-LVL III: CPT | Mod: PBBFAC,,, | Performed by: INTERNAL MEDICINE

## 2023-08-25 PROCEDURE — 92552 PURE TONE AUDIOMETRY AIR: CPT | Mod: S$GLB,,,

## 2023-08-25 PROCEDURE — 99395 PR PREVENTIVE VISIT,EST,18-39: ICD-10-PCS | Mod: S$PBB,,, | Performed by: INTERNAL MEDICINE

## 2023-08-25 PROCEDURE — 99211 OFF/OP EST MAY X REQ PHY/QHP: CPT | Mod: PBBFAC

## 2023-08-25 PROCEDURE — 80053 COMPREHEN METABOLIC PANEL: CPT | Performed by: INTERNAL MEDICINE

## 2023-08-25 PROCEDURE — 99999 PR PBB SHADOW E&M-EST. PATIENT-LVL I: ICD-10-PCS | Mod: PBBFAC,,,

## 2023-08-25 PROCEDURE — 80061 LIPID PANEL: CPT | Performed by: INTERNAL MEDICINE

## 2023-08-25 PROCEDURE — 93010 ELECTROCARDIOGRAM REPORT: CPT | Mod: ,,, | Performed by: INTERNAL MEDICINE

## 2023-08-25 PROCEDURE — 97750 PHYSICAL PERFORMANCE TEST: CPT | Performed by: PHYSICAL THERAPIST

## 2023-08-25 PROCEDURE — 92552 PR PURE TONE AUDIOMETRY, AIR: ICD-10-PCS | Mod: S$GLB,,,

## 2023-08-25 PROCEDURE — 97802 MEDICAL NUTRITION INDIV IN: CPT | Mod: S$GLB,,, | Performed by: DIETITIAN, REGISTERED

## 2023-08-25 PROCEDURE — 99199 PR SPECIAL SERVICE/PROC/REPORT: ICD-10-PCS | Mod: ,,, | Performed by: DIETITIAN, REGISTERED

## 2023-08-25 PROCEDURE — 83036 HEMOGLOBIN GLYCOSYLATED A1C: CPT | Performed by: INTERNAL MEDICINE

## 2023-08-25 PROCEDURE — 97802 PR MED NUTR THER, 1ST, INDIV, EA 15 MIN: ICD-10-PCS | Mod: S$GLB,,, | Performed by: DIETITIAN, REGISTERED

## 2023-08-25 NOTE — PROGRESS NOTES
":  1990    DX: Z00.0  Orders:  Fitness Evaluation    An [facility] Fitness Component evaluation was completed.  Results are as follows:    Height (in):    65                            Weight (lbs):    187                                    BMI:   31.2    Resting Energy Expenditure:         1558       kcal/24 hrs.                             Body Composition:          37.84  % body fat             Rating: Poor    Waist to Hip Ratio:     0.97                    Risk:  High Risk    Hip taken over clothing:      Yes          Muscular Strength and Endurance Assessment:               Strength (lbs):     Right: 57.3             Rating:  Below Average     Left:  51.7           Rating: Average    Push-ups:   1                 Rating:  Needs Improvement   Curl-ups :   21                Rating:  Average     Flexibility Testing:   Sit and Reach (cm):    30                       Rating:  Good     Assessment: Patient's  employed with [employer].  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  States she used to be in better health, knows she is "out of shape." Has taken a little longer to recover after 2nd child was delivered via .  Back in the gym 30 minutes 2 days/week.  Gets in 10,000 - 12,000 steps per day. Had lost some weight though gained it back when under increased stress.  She does have Nutrition appt today.      Date 2023   Height (in): 65   Weight: 187   BMI: 31.18   Body Fat %: 37.84   Waist (cm): 104.8   Hip (cm): 108.1   WHR: 0.97   RBP: 124/83   RHR: 76    Rt (lbs): 57    Lt (lbs): 52   Push-up  1   Curl-up  21   Flexibility  30   REE  (Kcals) 1558       The patient completed the testing procedures without complications.       "

## 2023-08-25 NOTE — PROGRESS NOTES
"Subjective:      Patient ID: Yessi Guajardo is a 33 y.o. female.    Chief Complaint: Executive Health    HPI    It was a pleasure to see you and perform your Executive Health physical on 8/25/23.   At the time of your visit you are 33 years old.       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     You currently take no medications.      Family history:  Mother with hypertension  Father is healthy  Siblings healthy     Your health maintenance includes tetanus vaccine 07/2020. Up to date with gyn.  Remember flu vaccine should be available in September or October.          Review of Systems   Constitutional:  Negative for chills and fever.   HENT:  Negative for ear pain and sore throat.    Respiratory:  Negative for cough.    Cardiovascular:  Negative for chest pain.   Gastrointestinal:  Negative for abdominal pain and blood in stool.   Genitourinary:  Negative for dysuria and hematuria.   Neurological:  Negative for seizures and syncope.     Objective:   /83   Pulse 76   Temp 98.5 °F (36.9 °C)   Ht 5' 5" (1.651 m)   Wt 84.8 kg (187 lb)   LMP 08/20/2023   Breastfeeding Yes   BMI 31.12 kg/m²     Physical Exam  Constitutional:       General: She is not in acute distress.     Appearance: She is well-developed.   HENT:      Head: Normocephalic and atraumatic.   Eyes:      Extraocular Movements: Extraocular movements intact.   Neck:      Thyroid: No thyromegaly.   Cardiovascular:      Rate and Rhythm: Normal rate and regular rhythm.   Pulmonary:      Breath sounds: Normal breath sounds. No wheezing or rales.   Abdominal:      General: Bowel sounds are normal.      Palpations: Abdomen is soft.      Tenderness: There is no abdominal tenderness.   Musculoskeletal:         General: No swelling.      Cervical back: Neck supple. No rigidity.   Lymphadenopathy:      Cervical: No cervical adenopathy.   Skin:     General: Skin is warm and dry.   Neurological:      " Mental Status: She is alert and oriented to person, place, and time.   Psychiatric:         Behavior: Behavior normal.         Lab Results   Component Value Date    WBC 6.19 08/25/2023    HGB 13.9 08/25/2023    HGB 14.3 09/01/2022    HGB 13.8 08/12/2022    HCT 40.1 08/25/2023    MCV 92 08/25/2023    MCV 93 09/01/2022    MCV 92.1 08/12/2022     08/25/2023    CHOL 238 (H) 08/25/2023    TRIG 175 (H) 08/25/2023    HDL 46 08/25/2023    LDLCALC 157.0 08/25/2023    LDLCALC 155.6 09/01/2022    LDLCALC 107.4 09/17/2021    ALT 13 08/25/2023    AST 14 08/25/2023     08/25/2023    K 4.0 08/25/2023    CALCIUM 9.3 08/25/2023     08/25/2023    CO2 23 08/25/2023    BUN 7 08/25/2023    CREATININE 0.7 08/25/2023    CREATININE 0.5 09/01/2022    CREATININE 0.6 08/12/2022    EGFRNORACEVR >60 08/25/2023    EGFRNORACEVR >60 09/01/2022    EGFRNORACEVR > 60 08/12/2022    TSH 0.783 09/01/2022    TSH 1.487 09/17/2021    TSH 1.740 10/30/2020    GLU 96 08/25/2023    HGBA1C 4.9 09/01/2022    HGBA1C 4.7 09/17/2021    HGBA1C 4.7 10/30/2020          The ASCVD Risk score (Joselito DK, et al., 2019) failed to calculate for the following reasons:    The 2019 ASCVD risk score is only valid for ages 40 to 79     Assessment:     1. Routine general medical examination at a health care facility    2. Insulin resistance    3. Obesity (BMI 30-39.9)      Plan:   1. Routine general medical examination at a health care facility    2. Insulin resistance  -     INSULIN, RANDOM; Future; Expected date: 08/25/2023    3. Obesity (BMI 30-39.9)  -     Ambulatory referral/consult to [facility]; Future; Expected date: 09/01/2023      ...  Heart healthy diet and regular exercise.  Health maintenance reviewed with patient.  There are no Patient Instructions on file for this visit.    Future Appointments   Date Time Provider Department Center   8/16/2024 12:10 PM Kerry Ackerman MD [facility]       Lab Frequency Next  Occurrence   Hemoglobin A1C Once 09/01/2022   Lipid Panel Once 09/01/2022   Ambulatory Referral/Consult to Nutrition Services - [facility] Once 09/08/2022       Follow up if symptoms worsen or fail to improve.

## 2023-08-25 NOTE — PROGRESS NOTES
Executive Health Screening    Yessi Guajardo was seen 08/25/2023 for an executive health hearing screen. Otoscopy was unremarkable in both ears. Results revealed normal hearing sensitivity 250-8000 Hz, bilaterally.    Patient was counseled on the above findings.     Recommendations:  1. Consistent use of hearing protective devices when around loud noise.  2. Annual hearing screenings.

## 2023-08-25 NOTE — PROGRESS NOTES
"Nutrition Assessment  Session Time:  60 minutes      Client name:  Yessi Guajardo  :  1990  Age:  33 y.o.  Gender:  female    Client states:  present for annual physical with [facility].    Reports losing some weight through: including smoothies in diet, reduced intake of bread/tortillas, tracking 1500 calories daily.  Was able to achieve a 15 lbs weight loss.    Reports going through a stressful 6 months recently resulting in weight gain.   Realizing now her problem is that she does not eat enough. May eat 1x/day but in large portions.     Recently gotten back on track and was able to lose 5 lbs within the last month.  Currently counting calories at 1700 calories daily.   Takes 12,000 steps per day with normal daily activities.   Reports going to the gym 2x/week and stays busy with 2 kids + cleaning house.   Limited dining out. Does not consume fast food often, No sodas, No candy.     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    Vitamins, Minerals, and/or Supplements:  not discussed     Food/Medication Interactions:  Reviewed     Food Allergies or Intolerances:  shellfish     Social History    Marital status:    Employment:  not discussed    Social History     Tobacco Use    Smoking status: Never    Smokeless tobacco: Never   Substance Use Topics    Alcohol use: Yes     Comment: rarely        Lab Reports   Sodium   Date Value Ref Range Status   2023 140 136 - 145 mmol/L " Final     Potassium   Date Value Ref Range Status   08/25/2023 4.0 3.5 - 5.1 mmol/L Final     Chloride   Date Value Ref Range Status   08/25/2023 108 95 - 110 mmol/L Final   08/12/2022 104 98 - 107 mmol/L Final     CO2   Date Value Ref Range Status   08/25/2023 23 23 - 29 mmol/L Final     Glucose   Date Value Ref Range Status   08/25/2023 96 70 - 110 mg/dL Final     BUN   Date Value Ref Range Status   08/25/2023 7 6 - 20 mg/dL Final     Creatinine   Date Value Ref Range Status   08/25/2023 0.7 0.5 - 1.4 mg/dL Final     Calcium   Date Value Ref Range Status   08/25/2023 9.3 8.7 - 10.5 mg/dL Final     Total Protein   Date Value Ref Range Status   08/25/2023 7.4 6.0 - 8.4 g/dL Final     Albumin   Date Value Ref Range Status   08/25/2023 4.0 3.5 - 5.2 g/dL Final     Total Bilirubin   Date Value Ref Range Status   08/25/2023 0.3 0.1 - 1.0 mg/dL Final     Comment:     For infants and newborns, interpretation of results should be based  on gestational age, weight and in agreement with clinical  observations.    Premature Infant recommended reference ranges:  Up to 24 hours.............<8.0 mg/dL  Up to 48 hours............<12.0 mg/dL  3-5 days..................<15.0 mg/dL  6-29 days.................<15.0 mg/dL       Alkaline Phosphatase   Date Value Ref Range Status   08/25/2023 70 55 - 135 U/L Final     AST   Date Value Ref Range Status   08/25/2023 14 10 - 40 U/L Final     ALT   Date Value Ref Range Status   08/25/2023 13 10 - 44 U/L Final     Anion Gap   Date Value Ref Range Status   08/25/2023 9 8 - 16 mmol/L Final     eGFR if    Date Value Ref Range Status   09/17/2021 >60 >60 mL/min/1.73 m^2 Final     eGFR if non    Date Value Ref Range Status   09/17/2021 >60 >60 mL/min/1.73 m^2 Final     Comment:     Calculation used to obtain the estimated glomerular filtration  rate (eGFR) is the CKD-EPI equation.         Lab Results   Component Value Date    WBC 6.19 08/25/2023    HGB 13.9  08/25/2023    HCT 40.1 08/25/2023    MCV 92 08/25/2023     08/25/2023        Lab Results   Component Value Date    CHOL 238 (H) 08/25/2023     Lab Results   Component Value Date    HDL 46 08/25/2023     Lab Results   Component Value Date    LDLCALC 157.0 08/25/2023     Lab Results   Component Value Date    TRIG 175 (H) 08/25/2023     Lab Results   Component Value Date    CHOLHDL 19.3 (L) 08/25/2023     Lab Results   Component Value Date    HGBA1C 4.9 09/01/2022     BP Readings from Last 1 Encounters:   08/25/23 124/83       Food History  Provided above    Exercise History:  provided above    Cultural/Spiritual/Personal Preferences:  None identified    Support System:  family/friends    State of Change:  Preparation    Barriers to Change:  none    Diagnosis    obesity related to excess energy intake as evidenced by BMI 31.    Intervention    RMR (Method:  per fitness evaluation 8-):  1558 kcal  Activity Factor:  1.3    JUDI:  2025 - 500 = 1525 kcal    Goals:  1.  For meals/snacks, consume a combination of protein + fiber  2.  Increase exercise as able  3. Do not skip meals. Consume small meal/snack every 3-4 hours      Nutrition Education  The following education was provided to the patient:  Discussed meal planning/[employer] My Plate design.  Discussed weight management.  Discussed Heart Healthy Eating.  Suggested dietary modifications based on current dietary behaviors and individual food preferences.  *Lab results were pending at time of consult and so, not discussed with patient.  Discussed recommended fiber intake and food sources of such.  Discussed importance of small behavior/habit changes in improving long-term adherence and sustainability.    Patient verbalized understanding of nutrition education and recommendations received.    Handouts Provided  Meal Planning Guide  Eat Fit Shopping List  Balanced Plate  Snack List  Fueling Well On-The-Go    Monitoring/Evaluation    Monitor the  following:  Weight  BMI  % Body Fat  Caloric intake  Labs:  lipid panel, HgbA1c    Follow Up Plan:  Communication with referring healthcare provider is unnecessary at this time as patient presented as part of annual wellness exam.  However, will follow up with patient in 1-2 years.

## 2023-08-26 LAB
INSULIN COLLECTION INTERVAL: NORMAL
INSULIN SERPL-ACNC: 8.1 UU/ML

## 2024-05-06 DIAGNOSIS — Z00.00 ROUTINE GENERAL MEDICAL EXAMINATION AT A HEALTH CARE FACILITY: Primary | ICD-10-CM

## 2024-05-16 ENCOUNTER — CLINICAL SUPPORT (OUTPATIENT)
Dept: REHABILITATION | Facility: HOSPITAL | Age: 34
End: 2024-05-16
Payer: COMMERCIAL

## 2024-05-16 ENCOUNTER — HOSPITAL ENCOUNTER (OUTPATIENT)
Dept: CARDIOLOGY | Facility: HOSPITAL | Age: 34
Discharge: HOME | End: 2024-05-16
Attending: INTERNAL MEDICINE
Payer: COMMERCIAL

## 2024-05-16 ENCOUNTER — OFFICE VISIT (OUTPATIENT)
Dept: INTERNAL MEDICINE | Facility: CLINIC | Age: 34
End: 2024-05-16
Payer: COMMERCIAL

## 2024-05-16 ENCOUNTER — CLINICAL SUPPORT (OUTPATIENT)
Dept: AUDIOLOGY | Facility: CLINIC | Age: 34
End: 2024-05-16
Payer: COMMERCIAL

## 2024-05-16 ENCOUNTER — CLINICAL SUPPORT (OUTPATIENT)
Dept: INTERNAL MEDICINE | Facility: CLINIC | Age: 34
End: 2024-05-16
Payer: COMMERCIAL

## 2024-05-16 VITALS
BODY MASS INDEX: 32.99 KG/M2 | SYSTOLIC BLOOD PRESSURE: 112 MMHG | DIASTOLIC BLOOD PRESSURE: 72 MMHG | WEIGHT: 198 LBS | TEMPERATURE: 98 F | HEIGHT: 65 IN | HEART RATE: 65 BPM

## 2024-05-16 DIAGNOSIS — Z00.00 ROUTINE GENERAL MEDICAL EXAMINATION AT A HEALTH CARE FACILITY: Primary | ICD-10-CM

## 2024-05-16 DIAGNOSIS — Z00.00 ROUTINE GENERAL MEDICAL EXAMINATION AT A HEALTH CARE FACILITY: ICD-10-CM

## 2024-05-16 DIAGNOSIS — E66.9 OBESITY (BMI 30.0-34.9): ICD-10-CM

## 2024-05-16 DIAGNOSIS — Z01.12 HEARING CONSERVATION AND TREATMENT EXAM: Primary | ICD-10-CM

## 2024-05-16 LAB
ALBUMIN SERPL BCP-MCNC: 3.9 G/DL (ref 3.5–5.2)
ALP SERPL-CCNC: 65 U/L (ref 55–135)
ALT SERPL W/O P-5'-P-CCNC: 15 U/L (ref 10–44)
ANION GAP SERPL CALC-SCNC: 9 MMOL/L (ref 8–16)
AST SERPL-CCNC: 14 U/L (ref 10–40)
BILIRUB SERPL-MCNC: 0.4 MG/DL (ref 0.1–1)
BUN SERPL-MCNC: 8 MG/DL (ref 6–20)
CALCIUM SERPL-MCNC: 9.3 MG/DL (ref 8.7–10.5)
CHLORIDE SERPL-SCNC: 108 MMOL/L (ref 95–110)
CHOLEST SERPL-MCNC: 241 MG/DL (ref 120–199)
CHOLEST/HDLC SERPL: 4.8 {RATIO} (ref 2–5)
CO2 SERPL-SCNC: 22 MMOL/L (ref 23–29)
CREAT SERPL-MCNC: 0.7 MG/DL (ref 0.5–1.4)
ERYTHROCYTE [DISTWIDTH] IN BLOOD BY AUTOMATED COUNT: 12.3 % (ref 11.5–14.5)
EST. GFR  (NO RACE VARIABLE): >60 ML/MIN/1.73 M^2
ESTIMATED AVG GLUCOSE: 100 MG/DL (ref 68–131)
GLUCOSE SERPL-MCNC: 100 MG/DL (ref 70–110)
HBA1C MFR BLD: 5.1 % (ref 4–5.6)
HCT VFR BLD AUTO: 41.3 % (ref 37–48.5)
HDLC SERPL-MCNC: 50 MG/DL (ref 40–75)
HDLC SERPL: 20.7 % (ref 20–50)
HGB BLD-MCNC: 14.2 G/DL (ref 12–16)
LDLC SERPL CALC-MCNC: 156.8 MG/DL (ref 63–159)
MCH RBC QN AUTO: 31.8 PG (ref 27–31)
MCHC RBC AUTO-ENTMCNC: 34.4 G/DL (ref 32–36)
MCV RBC AUTO: 93 FL (ref 82–98)
NONHDLC SERPL-MCNC: 191 MG/DL
OHS QRS DURATION: 82 MS
OHS QTC CALCULATION: 416 MS
PLATELET # BLD AUTO: 296 K/UL (ref 150–450)
PMV BLD AUTO: 11.4 FL (ref 9.2–12.9)
POTASSIUM SERPL-SCNC: 3.8 MMOL/L (ref 3.5–5.1)
PROT SERPL-MCNC: 7.4 G/DL (ref 6–8.4)
RBC # BLD AUTO: 4.46 M/UL (ref 4–5.4)
SODIUM SERPL-SCNC: 139 MMOL/L (ref 136–145)
TRIGL SERPL-MCNC: 171 MG/DL (ref 30–150)
TSH SERPL DL<=0.005 MIU/L-ACNC: 1.15 UIU/ML (ref 0.4–4)
WBC # BLD AUTO: 6.18 K/UL (ref 3.9–12.7)

## 2024-05-16 PROCEDURE — 97750 PHYSICAL PERFORMANCE TEST: CPT | Performed by: PHYSICAL THERAPIST

## 2024-05-16 PROCEDURE — 85027 COMPLETE CBC AUTOMATED: CPT | Performed by: INTERNAL MEDICINE

## 2024-05-16 PROCEDURE — 92552 PURE TONE AUDIOMETRY AIR: CPT | Mod: S$GLB,,, | Performed by: AUDIOLOGIST

## 2024-05-16 PROCEDURE — 3008F BODY MASS INDEX DOCD: CPT | Mod: CPTII,S$GLB,, | Performed by: INTERNAL MEDICINE

## 2024-05-16 PROCEDURE — 1159F MED LIST DOCD IN RCRD: CPT | Mod: CPTII,S$GLB,, | Performed by: INTERNAL MEDICINE

## 2024-05-16 PROCEDURE — 83036 HEMOGLOBIN GLYCOSYLATED A1C: CPT | Performed by: INTERNAL MEDICINE

## 2024-05-16 PROCEDURE — 93010 ELECTROCARDIOGRAM REPORT: CPT | Mod: ,,, | Performed by: INTERNAL MEDICINE

## 2024-05-16 PROCEDURE — 3074F SYST BP LT 130 MM HG: CPT | Mod: CPTII,S$GLB,, | Performed by: INTERNAL MEDICINE

## 2024-05-16 PROCEDURE — 80053 COMPREHEN METABOLIC PANEL: CPT | Performed by: INTERNAL MEDICINE

## 2024-05-16 PROCEDURE — 99395 PREV VISIT EST AGE 18-39: CPT | Mod: S$GLB,,, | Performed by: INTERNAL MEDICINE

## 2024-05-16 PROCEDURE — 99999 PR PBB SHADOW E&M-EST. PATIENT-LVL I: CPT | Mod: PBBFAC,,, | Performed by: AUDIOLOGIST

## 2024-05-16 PROCEDURE — 93005 ELECTROCARDIOGRAM TRACING: CPT

## 2024-05-16 PROCEDURE — 80061 LIPID PANEL: CPT | Performed by: INTERNAL MEDICINE

## 2024-05-16 PROCEDURE — 3078F DIAST BP <80 MM HG: CPT | Mod: CPTII,S$GLB,, | Performed by: INTERNAL MEDICINE

## 2024-05-16 PROCEDURE — 99999 PR PBB SHADOW E&M-EST. PATIENT-LVL III: CPT | Mod: PBBFAC,,, | Performed by: INTERNAL MEDICINE

## 2024-05-16 PROCEDURE — 84443 ASSAY THYROID STIM HORMONE: CPT | Performed by: INTERNAL MEDICINE

## 2024-05-16 PROCEDURE — 97802 MEDICAL NUTRITION INDIV IN: CPT | Mod: S$GLB,,, | Performed by: DIETITIAN, REGISTERED

## 2024-05-16 NOTE — PROGRESS NOTES
"Subjective:      Patient ID: Yessi Guajardo is a 34 y.o. female.    Chief Complaint: Executive Health    HPI    It was a pleasure to see you and perform your Executive Health physical on 4/16/24   At the time of your visit you are 34 years old.       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     You currently take no medications.      Family history:  Mother with hypertension  Father is healthy  Siblings healthy     Your health maintenance includes tetanus vaccine 07/2020. Up to date with gyn.  Remember flu vaccine should be available in September or October.          Review of Systems   Constitutional:  Negative for chills and fever.   HENT:  Negative for ear pain and sore throat.    Respiratory:  Negative for cough.    Cardiovascular:  Negative for chest pain.   Gastrointestinal:  Negative for abdominal pain and blood in stool.   Genitourinary:  Negative for dysuria and hematuria.   Neurological:  Negative for seizures and syncope.     Objective:   /72   Pulse 65   Temp 98.1 °F (36.7 °C)   Ht 5' 5" (1.651 m)   Wt 89.8 kg (198 lb)   LMP 05/06/2024   Breastfeeding Yes   BMI 32.95 kg/m²     Physical Exam  Constitutional:       General: She is not in acute distress.     Appearance: She is well-developed.   HENT:      Head: Normocephalic and atraumatic.   Eyes:      Extraocular Movements: Extraocular movements intact.   Neck:      Thyroid: No thyromegaly.   Cardiovascular:      Rate and Rhythm: Normal rate and regular rhythm.   Pulmonary:      Breath sounds: Normal breath sounds. No wheezing or rales.   Abdominal:      General: Bowel sounds are normal.      Palpations: Abdomen is soft.      Tenderness: There is no abdominal tenderness.   Musculoskeletal:         General: No swelling.      Cervical back: Neck supple. No rigidity.   Lymphadenopathy:      Cervical: No cervical adenopathy.   Skin:     General: Skin is warm and dry.   Neurological:      " Mental Status: She is alert and oriented to person, place, and time.   Psychiatric:         Behavior: Behavior normal.         Lab Results   Component Value Date    WBC 6.18 05/16/2024    HGB 14.2 05/16/2024    HGB 13.9 08/25/2023    HGB 14.3 09/01/2022    HCT 41.3 05/16/2024    MCV 93 05/16/2024    MCV 92 08/25/2023    MCV 93 09/01/2022     05/16/2024    CHOL 241 (H) 05/16/2024    TRIG 171 (H) 05/16/2024    HDL 50 05/16/2024    LDLCALC 156.8 05/16/2024    LDLCALC 157.0 08/25/2023    LDLCALC 155.6 09/01/2022    ALT 15 05/16/2024    AST 14 05/16/2024     05/16/2024    K 3.8 05/16/2024    CALCIUM 9.3 05/16/2024     05/16/2024    CO2 22 (L) 05/16/2024    BUN 8 05/16/2024    CREATININE 0.7 05/16/2024    CREATININE 0.7 08/25/2023    CREATININE 0.5 09/01/2022    EGFRNORACEVR >60 05/16/2024    EGFRNORACEVR >60 08/25/2023    EGFRNORACEVR >60 09/01/2022    TSH 1.253 08/25/2023    TSH 0.783 09/01/2022    TSH 1.487 09/17/2021     05/16/2024    HGBA1C 5.0 08/25/2023    HGBA1C 4.9 09/01/2022    HGBA1C 4.7 09/17/2021          The ASCVD Risk score (Joselito DK, et al., 2019) failed to calculate for the following reasons:    The 2019 ASCVD risk score is only valid for ages 40 to 79     Assessment:     1. Routine general medical examination at a health care facility    2. Obesity (BMI 30.0-34.9)      Plan:   1. Routine general medical examination at a health care facility  Overview:  Heart healthy diet, regular exercise, and regular use of sunscreen.   HM reviewed        2. Obesity (BMI 30.0-34.9)  Overview:  Declines medication.       Heart healthy diet, regular exercise, and regular use of sunscreen.   HM reviewed  See exec health letter for details.     There are no Patient Instructions on file for this visit.    Future Appointments   Date Time Provider Department Center   5/16/2024 10:00 AM NUTRITION, [facility]   5/16/2024 11:00 AM Giovana Anton, [facility] AUDIO  [place]   8/16/2024 12:10 PM Kerry Ackerman MD [facility] [place] [facility]       Lab Frequency Next Occurrence   Ambulatory referral/consult to [facility] 09/01/2023       No follow-ups on file.

## 2024-05-16 NOTE — PROGRESS NOTES
Executive Health Screening    Yessi Guajardo was seen 05/16/2024 for an executive health hearing screen. Otoscopy was unremarkable in both ears. Results revealed normal hearing sensitivity 250-8000 Hz, bilaterally.    Patient was counseled on the above findings.     Recommendations:  1. Consistent use of hearing protective devices when around loud noise.  2. Annual hearing screenings.

## 2024-05-16 NOTE — PROGRESS NOTES
"Nutrition Assessment  Session Time:  30 minutes      Client name:  Yessi Guajardo  :  1990  Age:  34 y.o.  Gender:  female    Client states:  Present for nutrition counseling as part of annual physical.  Last seen: 2023    Reports weight gain after last physical.  In 2024 began losing weight (10 lbs so far) again.    was stressful and patient was unable to focus on her health.  Tends to prepare healthy balanced meals for her kids (ages 2 and 3).  Tends to skip meals and may only eat once per day  around 1/2pm (quick options such as pizza or quesadilla).    Exercise: consistent// goes to the gym, reports resistance and weight amount being used has increased, feeling stronger    Continues to breastfeed her 2 year old and is trying to quit but child is not ready.    Reports weighing 130 lbs prior to pregnancy and would like to work towards weighing close to that amount.       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    Social History     Tobacco Use    Smoking status: Never    Smokeless tobacco: Never   Substance Use Topics    Alcohol use: Yes     Comment: rarely        Lab Reports   Sodium   Date Value Ref Range Status   2023 140 136 - 145 mmol/L Final     Potassium   Date Value Ref Range " Status   08/25/2023 4.0 3.5 - 5.1 mmol/L Final     Chloride   Date Value Ref Range Status   08/25/2023 108 95 - 110 mmol/L Final   08/12/2022 104 98 - 107 mmol/L Final     CO2   Date Value Ref Range Status   08/25/2023 23 23 - 29 mmol/L Final     Glucose   Date Value Ref Range Status   08/25/2023 96 70 - 110 mg/dL Final     BUN   Date Value Ref Range Status   08/25/2023 7 6 - 20 mg/dL Final     Creatinine   Date Value Ref Range Status   08/25/2023 0.7 0.5 - 1.4 mg/dL Final     Calcium   Date Value Ref Range Status   08/25/2023 9.3 8.7 - 10.5 mg/dL Final     Total Protein   Date Value Ref Range Status   08/25/2023 7.4 6.0 - 8.4 g/dL Final     Albumin   Date Value Ref Range Status   08/25/2023 4.0 3.5 - 5.2 g/dL Final     Total Bilirubin   Date Value Ref Range Status   08/25/2023 0.3 0.1 - 1.0 mg/dL Final     Comment:     For infants and newborns, interpretation of results should be based  on gestational age, weight and in agreement with clinical  observations.    Premature Infant recommended reference ranges:  Up to 24 hours.............<8.0 mg/dL  Up to 48 hours............<12.0 mg/dL  3-5 days..................<15.0 mg/dL  6-29 days.................<15.0 mg/dL       Alkaline Phosphatase   Date Value Ref Range Status   08/25/2023 70 55 - 135 U/L Final     AST   Date Value Ref Range Status   08/25/2023 14 10 - 40 U/L Final     ALT   Date Value Ref Range Status   08/25/2023 13 10 - 44 U/L Final     Anion Gap   Date Value Ref Range Status   08/25/2023 9 8 - 16 mmol/L Final     eGFR if    Date Value Ref Range Status   09/17/2021 >60 >60 mL/min/1.73 m^2 Final     eGFR if non    Date Value Ref Range Status   09/17/2021 >60 >60 mL/min/1.73 m^2 Final     Comment:     Calculation used to obtain the estimated glomerular filtration  rate (eGFR) is the CKD-EPI equation.         Lab Results   Component Value Date    WBC 6.19 08/25/2023    HGB 13.9 08/25/2023    HCT 40.1 08/25/2023    MCV 92  08/25/2023     08/25/2023        Lab Results   Component Value Date    CHOL 238 (H) 08/25/2023     Lab Results   Component Value Date    HDL 46 08/25/2023     Lab Results   Component Value Date    LDLCALC 157.0 08/25/2023     Lab Results   Component Value Date    TRIG 175 (H) 08/25/2023     Lab Results   Component Value Date    CHOLHDL 19.3 (L) 08/25/2023     Lab Results   Component Value Date    HGBA1C 5.0 08/25/2023     BP Readings from Last 1 Encounters:   08/25/23 124/83       Food History  Provided above    Exercise History:  provided above    Cultural/Spiritual/Personal Preferences:  None identified    Support System:  family/friends    State of Change:  Contemplation    Barriers to Change:  lack of energy, stress    Diagnosis    obesity related to irregular meal pattern, inadequate energy intake as evidenced by patient's statement, BMI 32.    Intervention    RMR (Method:  [place] [facility]. Jeor):  1603 kcal  Activity Factor:  1.3    JUDI:  2083 - 500 = 1583 kcal    Goals:  1. Manage stress. Seek therapist.   2.  Consume small meal consisting of protein + fiber every 4 hours using suggestions provided.       Nutrition Education  The following education was provided to the patient:  Discussed weight management.  Discussed Heart Healthy Eating.  Suggested dietary modifications based on current dietary behaviors and individual food preferences.  Discussed nutrition-related lab values and dietary and/or lifestyle factors affecting them.    Patient verbalized understanding of nutrition education and recommendations received.    Handouts Provided  none    Monitoring/Evaluation    Monitor the following:  Weight  BMI  Caloric intake  Labs:  lipid panel, HgbA1c    Follow Up Plan:  Communication with referring healthcare provider is unnecessary at this time as patient presented as part of annual wellness exam.  However, will follow up with patient in 1-2 years.

## 2024-05-16 NOTE — PROGRESS NOTES
:  1990    DX: Z00.0  Orders:  Fitness Evaluation    [facility] Fitness Component evaluation was completed.  Results are as follows:    Height (in):    65                            Weight (lbs):    198                                    BMI:   33    Resting Energy Expenditure:         1603       kcal/24 hrs.                             Body Composition:          37.4  % body fat             Rating: Poor    Waist to Hip Ratio:     1.01                    Risk:  High Risk    Hip taken over clothing:      Yes          Muscular Strength and Endurance Assessment:               Strength (lbs):     Right: 62.5             Rating:  Average     Left:  52.2           Rating: Average    Push-ups:   3                 Rating:  Needs Improvement   Curl-ups :   55                Rating:  Well Above Average     Flexibility Testing:   Sit and Reach (cm):    25.1                       Rating:  Fair     Assessment: patient's  is employed with [employer].  Patient reports that she has been gaining weight and unable to lose at this time.  She stays busy with her oldest child who was diagnosed with Autism so a lot of her time is at appointments for him.  She attempts to attend the gym but is not always able.       Date 2024   Height (in): 65 65   Weight: 198 187   BMI: 33.02 31.18   Body Fat %: 37.40 37.84   Waist (cm): 111.8 104.8   Hip (cm): 110.8 108.1   WHR: 1.01 0.97   RBP: 112/72 124/83   RHR: 65 76    Rt (lbs): 63 57    Lt (lbs): 52 52   Push-up  3 1   Curl-up  55 21   Flexibility  25 30   REE  (Kcals) 1603 1558   Final Chart input copied from results page on Excel    The patient completed the testing procedures without complications.

## 2024-08-19 PROBLEM — Z00.00 ROUTINE GENERAL MEDICAL EXAMINATION AT A HEALTH CARE FACILITY: Status: RESOLVED | Noted: 2024-05-16 | Resolved: 2024-08-19

## 2024-11-05 ENCOUNTER — PATIENT MESSAGE (OUTPATIENT)
Dept: RESEARCH | Facility: HOSPITAL | Age: 34
End: 2024-11-05
Payer: COMMERCIAL